# Patient Record
Sex: MALE | Race: WHITE | Employment: OTHER | URBAN - METROPOLITAN AREA
[De-identification: names, ages, dates, MRNs, and addresses within clinical notes are randomized per-mention and may not be internally consistent; named-entity substitution may affect disease eponyms.]

---

## 2021-10-06 ENCOUNTER — OFFICE VISIT (OUTPATIENT)
Dept: RHEUMATOLOGY | Age: 81
End: 2021-10-06
Payer: MEDICARE

## 2021-10-06 VITALS
WEIGHT: 132 LBS | TEMPERATURE: 97.9 F | HEART RATE: 94 BPM | SYSTOLIC BLOOD PRESSURE: 114 MMHG | DIASTOLIC BLOOD PRESSURE: 69 MMHG

## 2021-10-06 DIAGNOSIS — R25.2 MUSCLE CRAMPS: ICD-10-CM

## 2021-10-06 DIAGNOSIS — I73.9 CLAUDICATION (HCC): Primary | ICD-10-CM

## 2021-10-06 DIAGNOSIS — R74.01 TRANSAMINITIS: ICD-10-CM

## 2021-10-06 DIAGNOSIS — M62.81 PROXIMAL MUSCLE WEAKNESS: ICD-10-CM

## 2021-10-06 PROCEDURE — G8421 BMI NOT CALCULATED: HCPCS | Performed by: INTERNAL MEDICINE

## 2021-10-06 PROCEDURE — G8510 SCR DEP NEG, NO PLAN REQD: HCPCS | Performed by: INTERNAL MEDICINE

## 2021-10-06 PROCEDURE — G8536 NO DOC ELDER MAL SCRN: HCPCS | Performed by: INTERNAL MEDICINE

## 2021-10-06 PROCEDURE — 99205 OFFICE O/P NEW HI 60 MIN: CPT | Performed by: INTERNAL MEDICINE

## 2021-10-06 PROCEDURE — G8427 DOCREV CUR MEDS BY ELIG CLIN: HCPCS | Performed by: INTERNAL MEDICINE

## 2021-10-06 RX ORDER — LEVOTHYROXINE SODIUM 125 UG/1
125 TABLET ORAL DAILY
COMMUNITY

## 2021-10-06 RX ORDER — LISINOPRIL AND HYDROCHLOROTHIAZIDE 10; 12.5 MG/1; MG/1
1 TABLET ORAL DAILY
COMMUNITY

## 2021-10-06 RX ORDER — ATORVASTATIN CALCIUM 20 MG/1
20 TABLET, FILM COATED ORAL DAILY
COMMUNITY
Start: 2021-08-23

## 2021-10-06 NOTE — PROGRESS NOTES
REASON FOR VISIT:    is a 79 y.o. male with history of hypertension, hyperlipidemia, hypothyroidism, and Stage 3 CKD who is being referred to [facility] at the request of Dr. Mikey Damian, regarding a diagnosis of leg cramps. HISTORY OF PRESENT ILLNESS    Legs feel stiff, particularly in the calves. Now feels thighs and knees are OK. . had left knee replacement about 3 years ago with resolution of his pain at the time. Walks his small dog 3-4x/day, at a leisurely pace. Does fine as long as he is sitting, but with walking \"stiffness\" sets in and are more uncomfortable. Similar discomfort occurs with sleeping and wakes him up at night. Hasn't tried medications for this. Runs out of energy with walking. Feels he is an \"8 cylinder engine running on 6 cylinders. \" During the day stays in the house. Gets 7-8 hours of sleep/night and feels refreshed in the morning, no extended napping during the day. Takes a B12 supplement OTC. No history of heart attack or exertional chest pain. Never smoker. REVIEW OF SYSTEMS  A comprehensive review of systems was negative except for that written in the HPI. A 10-point review of systems is per the new patient questionnaire, which has been reviewed extensively and scanned into the electronic medical record for future reference. Review of systems is as above and is otherwise negative. ALLERGIES  Patient has no allergy information on record. MEDICATIONS  Current Outpatient Medications   Medication Sig    lisinopril-hydroCHLOROthiazide (PRINZIDE, ZESTORETIC) 10-12.5 mg per tablet Take 1 Tablet by mouth daily.  atorvastatin (LIPITOR) 20 mg tablet Take 20 mg by mouth daily.  levothyroxine (SYNTHROID) 125 mcg tablet Take 125 mcg by mouth daily. No current facility-administered medications for this visit.        PAST MEDICAL HISTORY  Past Medical History:   Diagnosis Date    Arthritis        FAMILY HISTORY  No family history of RA, lupus, psoriasis. SOCIAL HISTORY  He  reports that he does not drink alcohol and does not use drugs. 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 In [employer] couple of years, never parachuting or LE injury. DATA  Visit Vitals  /69   Pulse 94   Temp 97.9 °F (36.6 °C)   Wt 132 lb (59.9 kg)    There is no height or weight on file to calculate BMI. No flowsheet data found. General:  The patient is well developed, well nourished, alert, and in no apparent distress. Eyes: Sclera are anicteric. No conjunctival injection. HEENT:  Oropharynx is clear. No oral ulcers. Adequate salivary pooling. No cervical or supraclavicular lymphadenopathy. Lungs:  Clear to auscultation bilaterally, without wheeze or stridor. Normal respiratory effort. Cor:  Regular rate and rhythm. No murmur rub or gallop. Abdomen: Soft, non-tender, without hepatomegaly or masses. Extremities: No calf tenderness or edema. 1+ DP pulses, PT pulses nonpalpable. Immediate cap refill in feet. Skin:  No significant abnormalities. Photodistributed telangiectasias. No tophi. No petechial, purpuric, or psoriaform rashes. Neuro: Symmetric thigh atrophy bilaterally. Bilateral shoulder abduction 4+/5, adduction 4+/5, elbow flexion and extension 5/5. Bilateral hip flexion 4/5, extension 4/5, knee flexion and extension 5/5, dorsi- and plantarflexion 5/5. Musculoskeletal:    A comprehensive musculoskeletal exam was performed for all joints of each upper and lower extremity and assessed for swelling, tenderness and range of motion. Results are documented as below:  Prominent Jc nodes without herbert synovitis. Bilateral CMC squaring with early left CMC subluxation. Left knee TKA scar, no warmth or effusion  No tibial tenderness. No evidence of synovitis in the small joints of the hands, wrists, shoulders, elbows, hips, knees or ankles.      Labs:  8/25/21: Tbili 0.4, AST 25, ALT 23, AlkP 122, Tprot 6.4, Alb 4.3;  21: Cr 1.77, K 5.3, LFTs WNL, LDL 59, HDL 53; TSH 18.7, T4 WNL; A1c 5.8,       Imagin11: LE US. Indication: Leg pain  CONCLUSION: Left lower extremity venous duplex negative for deep  venous thrombosis or thrombophlebitis. negative for deep vein  thrombosis of  right common femoral vein. Argonzález Jeanne Ball Cousin is a 79 y.o. male who presents for evaluation of clauditory calf pain and proximal myalgias, on a background of osteoarthritis and chronic kidney disease. Checking acute phase reactants as could trial an empiric course of prednisone for PMR if markedly elevated, though need also to rule out peripheral vascular disease. Encouraged a trial of gabapentin for restless legs through his PCP as well. Checking EMG and myositis-associated antibodies for proximal muscle weakness and atrophy. 1. Claudication ([address]  - LOWER EXT ART PVR MULT LEVEL SEG PRESSURES; Future  - MAGNESIUM; Future  - ALDOLASE; Future    2. Proximal muscle weakness  - EMG LIMITED; Future  - MYOMARKER 3 PLUS PROFILE (RDL); Future  - ANTI-CN-1A (NT5C1A) [employer] (RDL); Future  - CK; Future  - C REACTIVE PROTEIN, QT; Future  - CBC WITH AUTOMATED DIFF; Future  - METABOLIC PANEL, COMPREHENSIVE; Future  - MAGNESIUM; Future  - ALDOLASE; Future    3. Transaminitis  - ANTI-CN-1A (NT5C1A) [employer] (RDL); Future  - CK; Future  - ANTI-CN-1A (NT5C1A) [employer] (RDL)  - ALDOLASE; Future    4. Muscle cramps  - MAGNESIUM; Future  - ALDOLASE; Future    Patient Instructions   1. Your calf tightness/cramping may be coming from one or more of a few different problems. Specifically, I am looking into the possibilities of claudication (poor blood flow through the legs), myositis (inflammation in the muscles), spinal stenosis (compression of a nerve in the back contributing to pain going into the legs), and restless legs. 2. EMG test for muscle and nerve health.  If you don't get a call to schedule this in the next week, call [phone] to schedule with Dr. Jo Buckley with [address] [facility]. 3. Peripheral vascular studies. Call [facility] at [phone] and ask for the Vascular Lab to schedule peripheral vascular studies (sometimes grouped with cardiovascular imaging)    4. Stop your potassium supplements, this may not be safe with your degree of kidney function. 5. Try replacing your magnesium supplements with the OTC \"Leg Cramp\" product by [employer]. 6. Labs from [facility] or [address] [place] [address]. 7. If the above tests are unremarkable, I'd recommend holding your atorvastatin for 2-4 weeks to see if this helps. 8. Your primary care doctor may wish to start you on gabapentin, which is a nerve pain medication that can help restless legs. For now though I'd recommend first getting the results of the above tests. 9. Return in 6 weeks to review results. Orders Placed This Encounter    MYOMARKER 3 PLUS PROFILE (RDL)    ANTI-CN-1A (NT5C1A) [employer] (RDL)    CK    C REACTIVE PROTEIN, QT    CBC WITH AUTOMATED DIFF    METABOLIC PANEL, COMPREHENSIVE    SED RATE (ESR)    MAGNESIUM    ALDOLASE    EMG LIMITED    lisinopril-hydroCHLOROthiazide (PRINZIDE, ZESTORETIC) 10-12.5 mg per tablet    levothyroxine (SYNTHROID) 125 mcg tablet    atorvastatin (LIPITOR) 20 mg tablet       Medications: I am having Greg Oseguera maintain his lisinopril-hydroCHLOROthiazide, levothyroxine, and atorvastatin. Follow up: Return in about 6 weeks (around 11/17/2021).     Face to face time: 55 minutes  Note preparation and records review day of service: 20 minutes  Total provider time day of service: 75 minutes    Donal Ramirez MD    Adult Rheumatology   [address], [address] [place] [address] Road   Phone [phone]  Fax [phone]

## 2021-10-06 NOTE — LETTER
10/12/2021    Patient: Jeromy Ronaldo   YOB: 1940   Date of Visit: 10/6/2021     Andria Combs MD  [address] [facility]  [address] [facility] [address]  Via Fax: [phone]     Andrews Perez MD  [address] [facility] [address]  Via Fax: [phone]    Dear MD Andrews Lisa MD,      Thank you for referring Mr. Yara Mayo to [address] [place] [address] for evaluation. My notes for this consultation are attached. If you have questions, please do not hesitate to call me. I look forward to following your patient along with you.       Sincerely,    Yaya Sunshine MD

## 2021-10-06 NOTE — PATIENT INSTRUCTIONS
1. Your calf tightness/cramping may be coming from one or more of a few different problems. Specifically, I am looking into the possibilities of claudication (poor blood flow through the legs), myositis (inflammation in the muscles), spinal stenosis (compression of a nerve in the back contributing to pain going into the legs), and restless legs. 2. EMG test for muscle and nerve health. If you don't get a call to schedule this in the next week, call [phone] to schedule with Dr. Paris Dover with [address] [place] [address]. 3. Peripheral vascular studies. Call [facility] at [phone] and ask for the Vascular Lab to schedule peripheral vascular studies (sometimes grouped with cardiovascular imaging)    4. Stop your potassium supplements, this may not be safe with your degree of kidney function. 5. Try replacing your magnesium supplements with the OTC \"Leg Cramp\" product by [employer]. 6. Labs from [facility] or [employer]. 7. If the above tests are unremarkable, I'd recommend holding your atorvastatin for 2-4 weeks to see if this helps. 8. Your primary care doctor may wish to start you on gabapentin, which is a nerve pain medication that can help restless legs. For now though I'd recommend first getting the results of the above tests. 9. Return in 6 weeks to review results.

## 2021-10-22 LAB
ALBUMIN SERPL-MCNC: 4.2 G/DL (ref 3.6–4.6)
ALBUMIN/GLOB SERPL: 1.8 {RATIO} (ref 1.2–2.2)
ALDOLASE SERPL-CCNC: 3.7 U/L (ref 3.3–10.3)
ALP SERPL-CCNC: 104 IU/L (ref 44–121)
ALT SERPL-CCNC: 17 IU/L (ref 0–44)
ANTI-MDA-5 AB (CADM-140)(RDL): <20 UNITS
ANTI-NXP-2 (P140) AB (RDL): <20 UNITS
ANTI-SAE1 AB, IGG (RDL): <20 UNITS
ANTI-TIF-1GAMMA AB (RDL): <20 UNITS
AST SERPL-CCNC: 19 IU/L (ref 0–40)
BASOPHILS # BLD AUTO: 0.1 X10E3/UL (ref 0–0.2)
BASOPHILS NFR BLD AUTO: 1 %
BILIRUB SERPL-MCNC: 0.5 MG/DL (ref 0–1.2)
BUN SERPL-MCNC: 33 MG/DL (ref 8–27)
BUN/CREAT SERPL: 21 (ref 10–24)
CALCIUM SERPL-MCNC: 9.3 MG/DL (ref 8.6–10.2)
CHLORIDE SERPL-SCNC: 107 MMOL/L (ref 96–106)
CK SERPL-CCNC: 111 U/L (ref 30–208)
CO2 SERPL-SCNC: 25 MMOL/L (ref 20–29)
CREAT SERPL-MCNC: 1.57 MG/DL (ref 0.76–1.27)
CRP SERPL-MCNC: <1 MG/L (ref 0–10)
EJ AB SER QL: NEGATIVE
ENA JO1 AB SER IA-ACNC: <20 UNITS
ENA PM/SCL AB SER-ACNC: <20 UNITS
ENA SS-A 52KD IGG SER IA-ACNC: <20 UNITS
EOSINOPHIL # BLD AUTO: 0.2 X10E3/UL (ref 0–0.4)
EOSINOPHIL NFR BLD AUTO: 4 %
ERYTHROCYTE [DISTWIDTH] IN BLOOD BY AUTOMATED COUNT: 12.3 % (ref 11.6–15.4)
ERYTHROCYTE [SEDIMENTATION RATE] IN BLOOD BY WESTERGREN METHOD: 5 MM/HR (ref 0–30)
FIBRILLARIN AB SER QL: NEGATIVE
GLOBULIN SER CALC-MCNC: 2.4 G/DL (ref 1.5–4.5)
GLUCOSE SERPL-MCNC: 97 MG/DL (ref 65–99)
HCT VFR BLD AUTO: 34.2 % (ref 37.5–51)
HGB BLD-MCNC: 11.9 G/DL (ref 13–17.7)
IMM GRANULOCYTES # BLD AUTO: 0 X10E3/UL (ref 0–0.1)
IMM GRANULOCYTES NFR BLD AUTO: 0 %
KU AB SER QL: NEGATIVE
LYMPHOCYTES # BLD AUTO: 1.5 X10E3/UL (ref 0.7–3.1)
LYMPHOCYTES NFR BLD AUTO: 23 %
Lab: <20 UNITS
MAGNESIUM SERPL-MCNC: 2 MG/DL (ref 1.6–2.3)
MCH RBC QN AUTO: 33.9 PG (ref 26.6–33)
MCHC RBC AUTO-ENTMCNC: 34.8 G/DL (ref 31.5–35.7)
MCV RBC AUTO: 97 FL (ref 79–97)
MI2 AB SER QL: NEGATIVE
MONOCYTES # BLD AUTO: 0.5 X10E3/UL (ref 0.1–0.9)
MONOCYTES NFR BLD AUTO: 8 %
NEUTROPHILS # BLD AUTO: 4.3 X10E3/UL (ref 1.4–7)
NEUTROPHILS NFR BLD AUTO: 64 %
OJ AB SER QL: NEGATIVE
PL12 AB SER QL: NEGATIVE
PL7 AB SER QL: NEGATIVE
PLATELET # BLD AUTO: 168 X10E3/UL (ref 150–450)
POTASSIUM SERPL-SCNC: 4.4 MMOL/L (ref 3.5–5.2)
PROT SERPL-MCNC: 6.6 G/DL (ref 6–8.5)
RBC # BLD AUTO: 3.51 X10E6/UL (ref 4.14–5.8)
SODIUM SERPL-SCNC: 143 MMOL/L (ref 134–144)
SRP AB SERPL QL: NEGATIVE
U1 SNRNP AB SER IA-ACNC: <20 UNITS
U2 SNRNP AB SER QL: NEGATIVE
WBC # BLD AUTO: 6.6 X10E3/UL (ref 3.4–10.8)

## 2021-11-03 ENCOUNTER — HOSPITAL ENCOUNTER (OUTPATIENT)
Dept: VASCULAR SURGERY | Age: 81
Discharge: HOME | End: 2021-11-03
Attending: INTERNAL MEDICINE
Payer: MEDICAID

## 2021-11-03 DIAGNOSIS — I73.9 CLAUDICATION (HCC): ICD-10-CM

## 2021-11-03 PROCEDURE — 93923 UPR/LXTR ART STDY 3+ LVLS: CPT

## 2021-11-04 LAB
LEFT ABI: 1.04
LEFT ARM BP: 98 MMHG
LEFT CALF PRESSURE: 147 MMHG
LEFT HIGH THIGH PRESSURE: 147 MMHG
LEFT LOW THIGH PRESSURE: 129 MMHG
LEFT POSTERIOR TIBIAL: 113 MMHG
LEFT TBI: 0.84
LEFT TOE PRESSURE: 94 MMHG
RIGHT ABI: 1.21
RIGHT ARM BP: 112 MMHG
RIGHT CALF PRESSURE: 148 MMHG
RIGHT HIGH THIGH PRESSURE: 138 MMHG
RIGHT LOW THIGH PRESSURE: 132 MMHG
RIGHT POSTERIOR TIBIAL: 136 MMHG
RIGHT TBI: 1.11
RIGHT TOE PRESSURE: 124 MMHG
VAS LEFT DORSALIS PEDIS BP: 116 MMHG
VAS RIGHT DORSALIS PEDIS BP: 120 MMHG

## 2021-12-06 ENCOUNTER — TELEPHONE (OUTPATIENT)
Dept: RHEUMATOLOGY | Age: 81
End: 2021-12-06

## 2021-12-07 ENCOUNTER — TELEPHONE (OUTPATIENT)
Dept: RHEUMATOLOGY | Age: 81
End: 2021-12-07

## 2021-12-07 NOTE — TELEPHONE ENCOUNTER
CALLED PATIENT ON 12/7/2021 LEFT VOICE MESSAGE FOR PATIENT TO CALL BACK INTO THE OFFICE TO RESCHEDULE APPT FOR 12/16/2021 DUE OFFICE [place] [facility].  SDH

## 2021-12-22 ENCOUNTER — OFFICE VISIT (OUTPATIENT)
Dept: RHEUMATOLOGY | Age: 81
End: 2021-12-22
Payer: MEDICAID

## 2021-12-22 VITALS
TEMPERATURE: 98.1 F | HEART RATE: 100 BPM | SYSTOLIC BLOOD PRESSURE: 120 MMHG | HEIGHT: 73 IN | DIASTOLIC BLOOD PRESSURE: 70 MMHG | WEIGHT: 137.8 LBS | BODY MASS INDEX: 18.26 KG/M2 | OXYGEN SATURATION: 97 % | RESPIRATION RATE: 18 BRPM

## 2021-12-22 DIAGNOSIS — M17.11 PRIMARY OSTEOARTHRITIS OF RIGHT KNEE: Primary | ICD-10-CM

## 2021-12-22 DIAGNOSIS — M79.10 MYALGIA: ICD-10-CM

## 2021-12-22 PROCEDURE — 20610 DRAIN/INJ JOINT/BURSA W/O US: CPT | Performed by: INTERNAL MEDICINE

## 2021-12-22 PROCEDURE — 99215 OFFICE O/P EST HI 40 MIN: CPT | Performed by: INTERNAL MEDICINE

## 2021-12-22 RX ORDER — TRIAMCINOLONE ACETONIDE 40 MG/ML
40 INJECTION, SUSPENSION INTRA-ARTICULAR; INTRAMUSCULAR ONCE
Qty: 1 ML | Refills: 0
Start: 2021-12-22 | End: 2021-12-22

## 2021-12-22 RX ORDER — LIDOCAINE HYDROCHLORIDE 10 MG/ML
1 INJECTION INFILTRATION; PERINEURAL ONCE
Qty: 1 ML | Refills: 0
Start: 2021-12-22 | End: 2021-12-22

## 2021-12-22 NOTE — PATIENT INSTRUCTIONS
1. Try the \"Leg Cramps\" product daily by [employer] if you haven't already for the nighttime cramping. 2. I don't see any evidence of autoimmune disease affecting the muscles. There is some changes to the sensory nerves in the legs, but this may be a function of aging rather than autoimmune attack requiring immunosuppression. 3. Call Dr. Dedra Nobles office to trial further injections of the right knee if this helps the knee pain and calf stiffness. You previously saw Dr. Noah Garcia and Kimmie Cowart at [facility] as well if you'd like to continue with them. 4. Continue pushing activity the best you can. I'm referring you to physical therapy to help you with your lower body strength. 5. Talk to your PCP about holding your Lipitor for a month, to see if this helps your cramping pain.     6. Return as needed for new concerns of joint swelling

## 2021-12-22 NOTE — PROGRESS NOTES
Chief Complaint   Patient presents with    Joint Pain     leg     1. Have you been to the ER, urgent care clinic since your last visit? Hospitalized since your last visit? No    2. Have you seen or consulted any other health care providers outside of the [address] [place] [address] since your last visit? Include any pap smears or colon screening.  No

## 2021-12-22 NOTE — PROGRESS NOTES
REASON FOR VISIT:    is a 79 y.o. male with history of hypertension, hyperlipidemia, hypothyroidism, and Stage 3 CKD who is returning for followup of leg stiffness. HISTORY OF PRESENT ILLNESS    Still feeling \"stretch\" in the muscles with extending legs. Stretching at night helps the nocturnal cramping. Primarily still in upper calves and back of knees. In interim completed EMG, PVR's, and labs (including sed rate, CK) all of which were unremarkable. No drenching nights sweats. Weight up 5 pounds in the last 2 months. Disease History from initial visit 10/6/2021   Legs feel stiff, particularly in the calves. Now feels thighs and knees are OK. . had left knee replacement about 3 years ago with resolution of his pain at the time. Walks his small dog 3-4x/day, at a leisurely pace. Does fine as long as he is sitting, but with walking \"stiffness\" sets in and are more uncomfortable. Similar discomfort occurs with sleeping and wakes him up at night. Hasn't tried medications for this. Runs out of energy with walking. Feels he is an \"8 cylinder engine running on 6 cylinders. \" During the day stays in the house. Gets 7-8 hours of sleep/night and feels refreshed in the morning, no extended napping during the day. Takes a B12 supplement OTC. No history of heart attack or exertional chest pain. Never smoker. REVIEW OF SYSTEMS  A comprehensive review of systems was negative except for that written in the HPI. A 10-point review of systems is per the new patient questionnaire, which has been reviewed extensively and scanned into the electronic medical record for future reference. Review of systems is as above and is otherwise negative. ALLERGIES  Patient has no allergy information on record. MEDICATIONS  Current Outpatient Medications   Medication Sig    lisinopril-hydroCHLOROthiazide (PRINZIDE, ZESTORETIC) 10-12.5 mg per tablet Take 1 Tablet by mouth daily.     levothyroxine (SYNTHROID) 125 mcg tablet Take 125 mcg by mouth daily.  atorvastatin (LIPITOR) 20 mg tablet Take 20 mg by mouth daily. No current facility-administered medications for this visit. PAST MEDICAL HISTORY  Past Medical History:   Diagnosis Date    Arthritis        FAMILY HISTORY  No family history of RA, lupus, psoriasis. SOCIAL HISTORY  He  reports that he has never smoked. He has never used smokeless tobacco. He reports that he does not drink alcohol and does not use drugs. 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 In [employer] couple of years, never parachuting or LE injury. DATA  Visit Vitals  /70 (BP 1 Location: Left upper arm, BP Patient Position: Sitting)   Pulse 100   Temp 98.1 °F (36.7 °C) (Oral)   Resp 18   Ht 6' 1\" (1.854 m)   Wt 137 lb 12.8 oz (62.5 kg)   SpO2 97%   BMI 18.18 kg/m²    Body mass index is 18.18 kg/m². No flowsheet data found. General:  The patient is well developed, well nourished, alert, and in no apparent distress. Eyes: Sclera are anicteric. No conjunctival injection. HEENT:  Oropharynx is clear. No oral ulcers. Adequate salivary pooling. No cervical or supraclavicular lymphadenopathy. Lungs:  Clear to auscultation bilaterally, without wheeze or stridor. Normal respiratory effort. Cor:  Regular rate and rhythm. No murmur rub or gallop. Abdomen: Soft, non-tender, without hepatomegaly or masses. Extremities: No calf tenderness or edema. 1+ DP pulses, PT pulses nonpalpable. Immediate cap refill in feet. Skin:  No significant abnormalities. Photodistributed telangiectasias. No tophi. No petechial, purpuric, or psoriaform rashes. Neuro: Symmetric thigh atrophy bilaterally. Bilateral shoulder abduction 4+/5, adduction 4+/5, elbow flexion and extension 5/5. Bilateral hip flexion 4+/5, extension 4+/5, knee flexion and extension 5/5, dorsi- and plantarflexion 5/5.   Musculoskeletal:    A comprehensive musculoskeletal exam was performed for all joints of each upper and lower extremity and assessed for swelling, tenderness and range of motion. Results are documented as below:  Prominent Jc nodes without herbert synovitis. Bilateral CMC squaring with early left CMC subluxation. Left knee TKA scar, no warmth or effusion  No tibial tenderness. No evidence of synovitis in the small joints of the hands, wrists, shoulders, elbows, hips, knees or ankles. Labs:  10/8/21: Cr 1.57, LFTs WNL, aldolase 3.7, CRP <1mg/L, RDL myositis panel negative, anti-cN1A negative; ESR 5  21: Tbili 0.4, AST 25, ALT 23, AlkP 122, Tprot 6.4, Alb 4.3;  21: Cr 1.77, K 5.3, LFTs WNL, LDL 59, HDL 53; TSH 18.7, T4 WNL; A1c 5.8,       Imagin21 LE PVR's:  1. No evidence of significant peripheral arterial disease at rest in the right leg. 2. No evidence of significant peripheral arterial disease at rest in the left leg. 3. The right ankle/brachial index is 1.21 and the left ankle/brachial index is 1.04.  4. The right toe/brachial index is 1.11 and the left toe/brachial index is 0.84.    11: LE US. Indication: Leg pain  CONCLUSION: Left lower extremity venous duplex negative for deep  venous thrombosis or thrombophlebitis. negative for deep vein  thrombosis of  right common femoral vein. Trinh Perez  Mr. Idania Orr is a 79 y.o. male who presents for evaluation of exertional calf pain and proximal myalgias, on a background of osteoarthritis and chronic kidney disease. With normal EMG and acute phase reactants, myositis and polymyalgia rheumatica are very unlikely; PVRs are reassuring against true claudication. Together seems most likely that his underlying osteoarthritis is responsible for his symptoms. Reviewed conservative management of osteoarthritis, injected the right knee without immediate complication, and encouraged him to continue following with orthopedics for further injections.     1. Primary osteoarthritis of right knee  - TRIAMCINOLONE ACETONIDE INJ  - triamcinolone acetonide (Kenalog) 40 mg/mL injection; 1 mL by Intra artICUlar route once for 1 dose. Dispense: 1 mL; Refill: 0  - lidocaine (XYLOCAINE) 10 mg/mL (1 %) injection; 1 mL by IntraDERMal route once for 1 dose. Dispense: 1 mL; Refill: 0  - MO DRAIN/INJECT LARGE JOINT/BURSA  - REFERRAL TO ORTHOPEDICS  - REFERRAL TO PHYSICAL THERAPY    2. Myalgia  -Normal EMG, CK, and aldolase  -Reviewed stretching, trial of Leg Cramps product      Patient Instructions   1. Try the \"Leg Cramps\" product daily by [employer] if you haven't already for the nighttime cramping. 2. I don't see any evidence of autoimmune disease affecting the muscles. There is some changes to the sensory nerves in the legs, but this may be a function of aging rather than autoimmune attack requiring immunosuppression. 3. Call Dr. Jones OutBraymer office to trial further injections of the right knee if this helps the knee pain and calf stiffness. You previously saw Dr. Clare Tejada and González Penny at [facility] as well if you'd like to continue with them. 4. Continue pushing activity the best you can. I'm referring you to physical therapy to help you with your lower body strength. 5. Talk to your PCP about holding your Lipitor for a month, to see if this helps your cramping pain. 6. Return as needed for new concerns of joint swelling        Orders Placed This Encounter    [facility]    REFERRAL TO PHYSICAL THERAPY    TRIAMCINOLONE ACETONIDE INJ    MO DRAIN/INJECT LARGE JOINT/BURSA  - [address]    triamcinolone acetonide (Kenalog) 40 mg/mL injection    lidocaine (XYLOCAINE) 10 mg/mL (1 %) injection       Medications: I am having Kris Mccormack start on triamcinolone acetonide and lidocaine. I am also having him maintain his lisinopril-hydroCHLOROthiazide, levothyroxine, and atorvastatin. Follow up: Return if symptoms worsen or fail to improve.     Face to face time: 30 minutes (not including procedure)  Note preparation and records review day of service: 15 minutes  Total provider time day of service: 45 minutes    Sandie An MD    Adult Rheumatology   [facility], [address] [place] [address]   Phone [phone]  Fax [phone]    [facility] [address]  OFFICE PROCEDURE PROGRESS NOTE      Symptom relief from right knee Arthritis. (12/22/21)     Chart reviewed for the following:   Charli Hendrickson MD, have reviewed the History, Physical and updated the Allergic reactions for Kris Mccormack     TIME OUT performed immediately prior to start of procedure:   I, Sandie An MD, have performed the following reviews on [facility] prior to the start of the procedure            * Patient was identified by name and date of birth   * Agreement on procedure being performed was verified  * Risks and Benefits explained to the patient  * Procedure site verified and marked as necessary  * Patient was positioned for comfort  * Consent was signed and verified     Time: 12:05 PM    Date of procedure: 12/22/2021    Procedure performed by: Sandie An MD    Provider assisted by: self    Patient assisted by: self    How tolerated by patient: tolerated the procedure well with no complications      After consent was obtained, using sterile technique the right knee was prepped and topical ethyl chloride spray was used as local anesthetic. The joint was entered and no fluid was withdrawn. Kenalog 40 mg and 2 ml plain Lidocaine was then injected and the needle withdrawn. The procedure was well tolerated. The patient is asked to continue to rest the joint for a few more days before resuming regular activities. It may be more painful for the first 1-2 days. Watch for fever, or increased swelling or persistent pain in the joint.  Call or return to clinic prn if such symptoms occur or there is failure to improve as anticipated.

## 2023-01-02 ENCOUNTER — APPOINTMENT (OUTPATIENT)
Dept: GENERAL RADIOLOGY | Age: 83
End: 2023-01-02
Attending: EMERGENCY MEDICINE
Payer: MEDICAID

## 2023-01-02 ENCOUNTER — HOSPITAL ENCOUNTER (EMERGENCY)
Age: 83
Discharge: HOME | End: 2023-01-02
Attending: EMERGENCY MEDICINE
Payer: MEDICAID

## 2023-01-02 ENCOUNTER — APPOINTMENT (OUTPATIENT)
Dept: CT IMAGING | Age: 83
End: 2023-01-02
Attending: EMERGENCY MEDICINE
Payer: MEDICAID

## 2023-01-02 VITALS
DIASTOLIC BLOOD PRESSURE: 62 MMHG | HEART RATE: 115 BPM | TEMPERATURE: 100 F | RESPIRATION RATE: 16 BRPM | SYSTOLIC BLOOD PRESSURE: 127 MMHG | OXYGEN SATURATION: 96 %

## 2023-01-02 DIAGNOSIS — S09.90XA TRAUMATIC INJURY OF HEAD, INITIAL ENCOUNTER: ICD-10-CM

## 2023-01-02 DIAGNOSIS — J01.90 ACUTE NON-RECURRENT SINUSITIS, UNSPECIFIED LOCATION: ICD-10-CM

## 2023-01-02 DIAGNOSIS — M17.10 ARTHRITIS OF KNEE: ICD-10-CM
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
## 2023-01-02 LAB
COMMENT, HOLDF: NORMAL
SAMPLES BEING HELD,HOLD: NORMAL

## 2023-01-02 PROCEDURE — 73562 X-RAY EXAM OF KNEE 3: CPT

## 2023-01-02 PROCEDURE — 70450 CT HEAD/BRAIN W/O DYE: CPT

## 2023-01-02 PROCEDURE — 99284 EMERGENCY DEPT VISIT MOD MDM: CPT

## 2023-01-02 RX ORDER — ACETAMINOPHEN 500 MG
1000 TABLET ORAL 3 TIMES DAILY
Qty: 24 TABLET | Refills: 0 | Status: SHIPPED | OUTPATIENT
Start: 2023-01-02 | End: 2023-01-06

## 2023-01-02 RX ORDER — FLUTICASONE PROPIONATE 50 MCG
2 SPRAY, SUSPENSION (ML) NASAL DAILY
Qty: 1 EACH | Refills: 0 | Status: SHIPPED | OUTPATIENT
Start: 2023-01-02

## 2023-01-02 RX ORDER — GUAIFENESIN 1200 MG/1
1200 TABLET, EXTENDED RELEASE ORAL 2 TIMES DAILY
Qty: 10 TABLET | Refills: 0 | Status: SHIPPED | OUTPATIENT
Start: 2023-01-02

## 2023-01-02 NOTE — ED TRIAGE NOTES
Pt arrives to the ER with grandson for complaints of an unwitnessed fall. Pt reports that he was on the couch and was attempting to get up but ended up on the floor. Pt is reporting left knee stiffness and pain. Grandson reports that when he found patient, patient was experiencing intermittent confusion and did not know who grandson was at the time. Pt denies hitting head when he fell.

## 2023-01-03 NOTE — ED PROVIDER NOTES
79-year-old male presenting to the ER after having an unwitnessed fall. Patient reports that he was getting up from the couch when his knees felt very stiff and gave out on him causing him to fall to the floor. Possible head trauma but no reported loss conscious. No blood thinners. Patient reports having some left knee stiffness and some pain history of knee replacement in the past.  Denies any swelling or redness of the knee. Patient does endorse nasal congestion and sinus pressure. Denies any headache. No numbness no weakness no neck pain no chest pain abdominal pain or pain in extremities. Other than in the knee. Past Medical History:   Diagnosis Date    Arthritis        No past surgical history on file. No family history on file.     Social History     Socioeconomic History    Marital status:      Spouse name: Not on file    Number of children: Not on file    Years of education: Not on file    Highest education level: Not on file   Occupational History    Not on file   Tobacco Use    Smoking status: Never    Smokeless tobacco: Never   Vaping Use    Vaping Use: Never used   Substance and Sexual Activity    Alcohol use: Never    Drug use: Never    Sexual activity: Not on file   Other Topics Concern     Service No    Blood Transfusions No    Caffeine Concern No    Occupational Exposure No    Hobby Hazards No    Sleep Concern No    Stress Concern Not Asked    Weight Concern Not Asked    Special Diet Not Asked    Back Care Not Asked    Exercise Not Asked    Bike Helmet Not Asked    Seat Belt Not Asked    Self-Exams Not Asked   Social History Narrative    Not on file     Social Determinants of Health     Financial Resource Strain: Not on file   Food Insecurity: Not on file   Transportation Needs: Not on file   Physical Activity: Not on file   Stress: Not on file   Social Connections: Not on file   Intimate Partner Violence: Not on file   Housing Stability: Not on file ALLERGIES: Patient has no known allergies. Review of Systems   Constitutional:  Negative for chills and fever. HENT:  Positive for congestion and sinus pressure. Negative for sinus pain and sore throat. Eyes:  Negative for pain. Respiratory:  Negative for shortness of breath. Cardiovascular:  Negative for chest pain. Gastrointestinal:  Negative for abdominal pain, diarrhea, nausea and vomiting. Genitourinary:  Negative for dysuria and flank pain. Musculoskeletal:  Positive for arthralgias. Negative for back pain and neck pain. Skin:  Negative for rash. Neurological:  Negative for dizziness and headaches. All other systems reviewed and are negative. Vitals:    01/02/23 1822   BP: 127/62   Pulse: (!) 115   Resp: 16   Temp: 100 °F (37.8 °C)   SpO2: 96%            Physical Exam  Vitals and nursing note reviewed. Constitutional:       Appearance: He is well-developed. HENT:      Head: Normocephalic. Nose: Congestion present. Eyes:      Conjunctiva/sclera: Conjunctivae normal.   Cardiovascular:      Rate and Rhythm: Normal rate and regular rhythm. Pulmonary:      Effort: Pulmonary effort is normal. No respiratory distress. Breath sounds: Normal breath sounds. Abdominal:      General: Bowel sounds are normal.      Palpations: Abdomen is soft. Tenderness: There is no abdominal tenderness. Musculoskeletal:         General: Tenderness present. No swelling or deformity. Normal range of motion. Cervical back: Normal range of motion and neck supple. Skin:     General: Skin is warm. Capillary Refill: Capillary refill takes less than 2 seconds. Findings: No rash. Neurological:      Mental Status: He is alert and oriented to person, place, and time. Comments: No gross motor or sensory deficits        Medical Decision Making  Currently patient with history of arthritis with knee replacement on the left.   Got up from the couch when his knee felt very stiff and gave out on him causing him to fall. No loss conscious. Ordered imaging of the head due to fall with no signs of any significant head injury or trauma. X-ray of the knee to the pain with no significant findings. Patient has no focal neurologic deficits. Considered patient symptoms and history also obtained from patient's wife who is at bedside. No concern for ACS at this time. No other signs of any traumatic injuries. Patient having temperature of 100 and with nasal congestion CAT scan showing some sinus congestion consistent with sinusitis. Patient's symptoms been ongoing for the last 2 or 3 days. Unlikely bacterial sinus infection. Discussed treatment for his nasal congestion and viral sinusitis with Flonase and Mucinex. Given prescriptions. Patient had no chest pain or shortness of breath or preceding symptoms no concern for CAD    Amount and/or Complexity of Data Reviewed  Independent Historian: spouse  Radiology: ordered. Decision-making details documented in ED Course. Risk  OTC drugs. Prescription drug management. Procedures               Recent Results (from the past 24 hour(s))   SAMPLES BEING HELD    Collection Time: 01/02/23  6:21 PM   Result Value Ref Range    SAMPLES BEING HELD  1BLU, 1RED, 1LAV, 1GRN, 1PST, 1SST     COMMENT        Add-on orders for these samples will be processed based on acceptable specimen integrity and analyte stability, which may vary by analyte. CT HEAD WO CONT    Result Date: 1/2/2023  EXAM: CT HEAD WO CONT INDICATION: fall, intermittent confusion COMPARISON: None. CONTRAST: None. TECHNIQUE: Unenhanced CT of the head was performed using 5 mm images. Brain and bone windows were generated. Coronal and sagittal reformats. CT dose reduction was achieved through use of a standardized protocol tailored for this examination and automatic exposure control for dose modulation.   FINDINGS: The ventricles and sulci are normal in size, shape and configuration. There is mild periventricular white matter hypodensity. There is no intracranial hemorrhage, extra-axial collection, or mass effect. The basilar cisterns are open. No CT evidence of acute infarct. The bone windows demonstrate no abnormalities. The visualized portions of the paranasal sinuses and mastoid air cells are clear with the exception of an air-fluid level in the left maxillary sinus. .     No acute intracranial process identified. Underlying white matter disease Incidental left maxillary air-fluid level. Please clinically exclude acute sinusitis    XR KNEE LT 3 V    Result Date: 1/2/2023  EXAM: XR KNEE LT 3 V INDICATION: Status post fall with left knee pain. COMPARISON: None. FINDINGS: Three views of the left knee demonstrate no fracture or other acute osseous or articular abnormality. There is no effusion. The patient is status post left total knee replacement. No hardware complication. No acute abnormality.

## 2023-04-29 ENCOUNTER — HOSPITAL ENCOUNTER (EMERGENCY)
Age: 83
Discharge: HOME | End: 2023-04-29
Attending: EMERGENCY MEDICINE
Payer: MEDICARE

## 2023-04-29 ENCOUNTER — OFFICE VISIT (OUTPATIENT)
Dept: EMERGENCY DEPT | Age: 83
End: 2023-04-29
Attending: EMERGENCY MEDICINE
Payer: MEDICARE

## 2023-04-29 VITALS
TEMPERATURE: 98.3 F | RESPIRATION RATE: 16 BRPM | BODY MASS INDEX: 17.74 KG/M2 | DIASTOLIC BLOOD PRESSURE: 70 MMHG | HEIGHT: 73 IN | WEIGHT: 133.82 LBS | HEART RATE: 71 BPM | OXYGEN SATURATION: 100 % | SYSTOLIC BLOOD PRESSURE: 127 MMHG

## 2023-04-29 DIAGNOSIS — L03.011 CELLULITIS OF FINGER OF RIGHT HAND: Primary | ICD-10-CM

## 2023-04-29 PROCEDURE — 90471 IMMUNIZATION ADMIN: CPT

## 2023-04-29 PROCEDURE — 74011250636 HC RX REV CODE- 250/636: Performed by: EMERGENCY MEDICINE

## 2023-04-29 PROCEDURE — 99284 EMERGENCY DEPT VISIT MOD MDM: CPT

## 2023-04-29 PROCEDURE — 90714 TD VACC NO PRESV 7 YRS+ IM: CPT | Performed by: EMERGENCY MEDICINE

## 2023-04-29 RX ORDER — MULTIVITAMIN
1 TABLET ORAL DAILY
COMMUNITY

## 2023-04-29 RX ORDER — DOXYCYCLINE 100 MG/1
100 TABLET ORAL 2 TIMES DAILY
Qty: 20 TABLET | Refills: 0 | Status: SHIPPED | OUTPATIENT
Start: 2023-04-29 | End: 2023-05-09

## 2023-04-29 RX ADMIN — CLOSTRIDIUM TETANI TOXOID ANTIGEN (FORMALDEHYDE INACTIVATED) AND CORYNEBACTERIUM DIPHTHERIAE TOXOID ANTIGEN (FORMALDEHYDE INACTIVATED) 0.5 ML: 5; 2 INJECTION, SUSPENSION INTRAMUSCULAR at 16:14

## 2023-04-29 NOTE — ED PROVIDER NOTES
79-year-old male with history of arthritis, hypertension, hypothyroidism presents to the emergency department chief complaint of right index finger pain. He has swelling progressive for about a week to his right index finger at the DIP joint. No fever. Unsure of last tetanus. He was initially seen at [facility] who referred him to the emergency department. The history is provided by the patient, a relative and medical records. Finger Swelling   This is a new problem. Past Medical History:   Diagnosis Date    Arthritis     HTN (hypertension)     Hypothyroid        Past Surgical History:   Procedure Laterality Date    HX ORTHOPAEDIC      HX UROLOGICAL           History reviewed. No pertinent family history.     Social History     Socioeconomic History    Marital status:      Spouse name: Not on file    Number of children: Not on file    Years of education: Not on file    Highest education level: Not on file   Occupational History    Not on file   Tobacco Use    Smoking status: Never    Smokeless tobacco: Never   Vaping Use    Vaping Use: Never used   Substance and Sexual Activity    Alcohol use: Yes     Comment: rare    Drug use: Never    Sexual activity: Not on file   Other Topics Concern     Service No    Blood Transfusions No    Caffeine Concern No    Occupational Exposure No    Hobby Hazards No    Sleep Concern No    Stress Concern Not Asked    Weight Concern Not Asked    Special Diet Not Asked    Back Care Not Asked    Exercise Not Asked    Bike Helmet Not Asked    Seat Belt Not Asked    Self-Exams Not Asked   Social History Narrative    Not on file     Social Determinants of Health     Financial Resource Strain: Not on file   Food Insecurity: Not on file   Transportation Needs: Not on file   Physical Activity: Not on file   Stress: Not on file   Social Connections: Not on file   Intimate Partner Violence: Not on file   Housing Stability: Not on file         ALLERGIES: Patient has no known allergies. Review of Systems    Vitals:    04/29/23 1552   BP: 127/70   Pulse: 71   Resp: 16   Temp: 98.3 °F (36.8 °C)   SpO2: 100%   Weight: 60.7 kg (133 lb 13.1 oz)   Height: 6' 1\" (1.854 m)            Physical Exam  Vitals and nursing note reviewed. Constitutional:       Appearance: Normal appearance. Musculoskeletal:         General: Swelling and tenderness present. Comments: There is swelling and tenderness at the right next finger from the DIP joint and distally    Neurological:      Mental Status: He is alert. Medical Decision Making  79-year-old male presents as above with swelling tenderness to his right index finger. Bedside ultrasound demonstrates no focal fluid collection which would warrant opening. There are a couple of areas that are 1 to 2 mm. Will place on doxycycline with instructions to follow-up with primary care and return if worsens. Amount and/or Complexity of Data Reviewed  Independent Historian: caregiver  External Data Reviewed: notes. Risk  Prescription drug management.            Procedures

## 2023-04-29 NOTE — ED NOTES
Pt given discharge instructions by Dr Ricardo Mike he verbalizes an understanding pt stable at time of discharge

## 2023-04-29 NOTE — ED TRIAGE NOTES
Pt presents to ED with c/o wound on right 5th fingertip. Pt was seen at [facility] and sent here. There is swollen red fingertip with pusy drainage noted. He is afebrile.

## 2023-05-17 ENCOUNTER — APPOINTMENT (OUTPATIENT)
Facility: HOSPITAL | Age: 83
End: 2023-05-17
Payer: MEDICARE

## 2023-05-17 ENCOUNTER — HOSPITAL ENCOUNTER (EMERGENCY)
Facility: HOSPITAL | Age: 83
Discharge: HOME | End: 2023-05-17
Attending: EMERGENCY MEDICINE
Payer: MEDICARE

## 2023-05-17 VITALS
HEIGHT: 73 IN | OXYGEN SATURATION: 100 % | HEART RATE: 62 BPM | BODY MASS INDEX: 18.61 KG/M2 | DIASTOLIC BLOOD PRESSURE: 60 MMHG | RESPIRATION RATE: 18 BRPM | WEIGHT: 140.43 LBS | TEMPERATURE: 97.7 F | SYSTOLIC BLOOD PRESSURE: 148 MMHG

## 2023-05-17 DIAGNOSIS — L03.011 CELLULITIS OF FINGER OF RIGHT HAND: Primary | ICD-10-CM

## 2023-05-17 PROCEDURE — 10060 I&D ABSCESS SIMPLE/SINGLE: CPT

## 2023-05-17 PROCEDURE — 2500000003 HC RX 250 WO HCPCS: Performed by: EMERGENCY MEDICINE

## 2023-05-17 PROCEDURE — 73140 X-RAY EXAM OF FINGER(S): CPT

## 2023-05-17 PROCEDURE — 99283 EMERGENCY DEPT VISIT LOW MDM: CPT

## 2023-05-17 RX ORDER — LIDOCAINE HYDROCHLORIDE 10 MG/ML
5 INJECTION, SOLUTION EPIDURAL; INFILTRATION; INTRACAUDAL; PERINEURAL ONCE
Status: COMPLETED | OUTPATIENT
Start: 2023-05-17 | End: 2023-05-17

## 2023-05-17 RX ORDER — DOXYCYCLINE HYCLATE 100 MG
100 TABLET ORAL 2 TIMES DAILY
Qty: 14 TABLET | Refills: 0 | Status: SHIPPED | OUTPATIENT
Start: 2023-05-17 | End: 2023-05-24

## 2023-05-17 RX ADMIN — LIDOCAINE HYDROCHLORIDE 5 ML: 10 INJECTION, SOLUTION EPIDURAL; INFILTRATION; INTRACAUDAL; PERINEURAL at 14:43

## 2023-05-17 ASSESSMENT — PAIN DESCRIPTION - ORIENTATION: ORIENTATION: RIGHT

## 2023-05-17 ASSESSMENT — LIFESTYLE VARIABLES
HOW OFTEN DO YOU HAVE A DRINK CONTAINING ALCOHOL: NEVER
HOW MANY STANDARD DRINKS CONTAINING ALCOHOL DO YOU HAVE ON A TYPICAL DAY: PATIENT DOES NOT DRINK

## 2023-05-17 ASSESSMENT — PAIN - FUNCTIONAL ASSESSMENT: PAIN_FUNCTIONAL_ASSESSMENT: 0-10

## 2023-05-17 ASSESSMENT — PAIN DESCRIPTION - LOCATION: LOCATION: FINGER (COMMENT WHICH ONE)

## 2023-05-17 ASSESSMENT — PAIN DESCRIPTION - DESCRIPTORS: DESCRIPTORS: SORE

## 2023-05-17 ASSESSMENT — PAIN DESCRIPTION - PAIN TYPE: TYPE: ACUTE PAIN

## 2023-05-17 NOTE — ED NOTES
The patient was discharged home by provider in stable condition. The patient is alert and oriented, in no respiratory distress and discharge vital signs obtained. The patient's diagnosis, condition and treatment were explained. The patient expressed understanding and denies any questions or concerns at this time. Patient leaves treatment area ambulatory with all personal belongings.      Lorena Flood RN  05/17/23 [id number]

## 2023-05-17 NOTE — ED TRIAGE NOTES
Pt had an insect bite or brush stuck him to his right 5 th digit; placed on abx doxycyline 100 mg BID. Pt has completed his course of abx. Finger is red and has a puss pocket on the top of finger; pain is 7/10.  Denied n/v and fever

## 2023-09-20 NOTE — ED PROVIDER NOTES
Anesthesia Evaluation     Patient summary reviewed   no history of anesthetic complications:   NPO Solid Status: > 8 hours             Airway   Mallampati: II  TM distance: >3 FB  Neck ROM: full  Dental      Pulmonary    (-) COPD, asthma, sleep apnea, not a smoker  Cardiovascular   Exercise tolerance: excellent (>7 METS)    ECG reviewed  Patient on routine beta blocker and Beta blocker given within 24 hours of surgery    (+) hypertension, hyperlipidemia  (-) pacemaker, past MI, angina, cardiac stents      Neuro/Psych  (+) TIA, CVA  (-) seizures  GI/Hepatic/Renal/Endo    (+) GERD  (-) liver disease, no renal disease, diabetes    Musculoskeletal     Abdominal    Substance History      OB/GYN          Other   arthritis,   history of cancer                    Anesthesia Plan    ASA 3     general     intravenous induction     Anesthetic plan, risks, benefits, and alternatives have been provided, discussed and informed consent has been obtained with: patient.    
left open    Packing materials:  None  Post-procedure details:     Procedure completion:  Tolerated      FINAL IMPRESSION    No diagnosis found. Assessment/plan: 79year-old who presents with right fifth distal digit swelling, redness, pain. There is a raised white area on the dorsal surface below the nail (see picture below). I I&D'ed the area and only got a small amount of blood out. He has been on doxycycline which seemed to have helped. Reassuring appearance/exam with stable vital signs. X-ray shows arthritis and inflammatory changes. Home with more doxycycline and hand follow-up. Return precautions. Margarette Fall MD  4:00 PM      DISPOSITION/PLAN   DISPOSITION        PATIENT REFERRED TO:  No follow-up provider specified.     DISCHARGE MEDICATIONS:  New Prescriptions    No medications on file         (Please note that portions of this note were completed with a voice recognition program.  Efforts were made to edit the dictations but occasionally words are mis-transcribed.)    Margarette Fall MD (electronically signed)  Emergency Attending Physician / Physician Assistant / Nurse Practitioner               Cristina Oshea MD  05/17/23 [id number]